# Patient Record
Sex: FEMALE | Race: ASIAN
[De-identification: names, ages, dates, MRNs, and addresses within clinical notes are randomized per-mention and may not be internally consistent; named-entity substitution may affect disease eponyms.]

---

## 2021-04-04 ENCOUNTER — HOSPITAL ENCOUNTER (EMERGENCY)
Dept: HOSPITAL 53 - M ED | Age: 31
Discharge: HOME | End: 2021-04-04
Payer: COMMERCIAL

## 2021-04-04 VITALS — WEIGHT: 213.19 LBS | BODY MASS INDEX: 37.77 KG/M2 | HEIGHT: 63 IN

## 2021-04-04 VITALS — SYSTOLIC BLOOD PRESSURE: 127 MMHG | DIASTOLIC BLOOD PRESSURE: 72 MMHG

## 2021-04-04 DIAGNOSIS — J06.9: ICD-10-CM

## 2021-04-04 DIAGNOSIS — Z11.52: Primary | ICD-10-CM

## 2021-04-04 PROCEDURE — 87880 STREP A ASSAY W/OPTIC: CPT

## 2021-04-04 PROCEDURE — 71045 X-RAY EXAM CHEST 1 VIEW: CPT

## 2021-04-04 PROCEDURE — 99284 EMERGENCY DEPT VISIT MOD MDM: CPT

## 2021-04-04 PROCEDURE — 87804 INFLUENZA ASSAY W/OPTIC: CPT

## 2021-04-04 RX ADMIN — ALBUTEROL SULFATE SCH PUFF: 90 AEROSOL, METERED RESPIRATORY (INHALATION) at 16:35

## 2021-04-04 RX ADMIN — ALBUTEROL SULFATE SCH PUFF: 90 AEROSOL, METERED RESPIRATORY (INHALATION) at 17:46

## 2021-04-04 RX ADMIN — ALBUTEROL SULFATE SCH PUFF: 90 AEROSOL, METERED RESPIRATORY (INHALATION) at 18:07

## 2021-04-04 NOTE — REP
INDICATION:

cough, SOB, loss taste.



COMPARISON:

None.



TECHNIQUE:

Portable AP chest with the patient upright..



FINDINGS:

The lung fields are clear.

Cardiac size is normal.

The treva, mediastinum and skeletal structures are unremarkable.





IMPRESSION:

Essentially negative portable chest





<Electronically signed by Sukh Collado > 04/04/21 [id number]

## 2021-06-19 ENCOUNTER — HOSPITAL ENCOUNTER (OUTPATIENT)
Dept: HOSPITAL 53 - M LAB | Age: 31
End: 2021-06-19
Attending: PHYSICIAN ASSISTANT
Payer: COMMERCIAL

## 2021-06-19 DIAGNOSIS — E28.2: Primary | ICD-10-CM

## 2021-06-19 DIAGNOSIS — E66.8: ICD-10-CM

## 2021-06-19 LAB
ALBUMIN SERPL BCG-MCNC: 3.9 GM/DL (ref 3.2–5.2)
ALT SERPL W P-5'-P-CCNC: 50 U/L (ref 12–78)
BILIRUB SERPL-MCNC: 0.5 MG/DL (ref 0.2–1)
BUN SERPL-MCNC: 12 MG/DL (ref 7–18)
CALCIUM SERPL-MCNC: 8.7 MG/DL (ref 8.5–10.1)
CHLORIDE SERPL-SCNC: 106 MEQ/L (ref 98–107)
CO2 SERPL-SCNC: 27 MEQ/L (ref 21–32)
CREAT SERPL-MCNC: 0.76 MG/DL (ref 0.55–1.3)
EST. AVERAGE GLUCOSE BLD GHB EST-MCNC: 117 MG/DL (ref 60–110)
GFR SERPL CREATININE-BSD FRML MDRD: > 60 ML/MIN/{1.73_M2} (ref 60–?)
GLUCOSE SERPL-MCNC: 85 MG/DL (ref 70–100)
POTASSIUM SERPL-SCNC: 4.3 MEQ/L (ref 3.5–5.1)
PROT SERPL-MCNC: 8.2 GM/DL (ref 6.4–8.2)
SODIUM SERPL-SCNC: 138 MEQ/L (ref 136–145)
T4 FREE SERPL-MCNC: 0.97 NG/DL (ref 0.76–1.46)
TSH SERPL DL<=0.005 MIU/L-ACNC: 1.69 UIU/ML (ref 0.36–3.74)

## 2021-06-21 LAB — THYROPEROXIDASE AB SERPL IA-ACNC: 30 U/ML (ref ?–60)

## 2021-06-22 LAB
INSULIN SERPL-ACNC: 24.4 UIU/ML (ref 2.6–24.9)
THYROGLOB AB SERPL-ACNC: < 1 IU/ML (ref 0–0.9)
THYROGLOB SERPL-MCNC: 16.1 NG/ML (ref 1.5–38.5)

## 2021-07-19 ENCOUNTER — HOSPITAL ENCOUNTER (OUTPATIENT)
Dept: HOSPITAL 53 - M RAD | Age: 31
End: 2021-07-19
Attending: PHYSICIAN ASSISTANT
Payer: COMMERCIAL

## 2021-07-19 DIAGNOSIS — G43.009: Primary | ICD-10-CM

## 2021-07-19 DIAGNOSIS — E28.2: ICD-10-CM

## 2021-07-19 NOTE — REPVR
PROCEDURE INFORMATION: 

Exam: CT Head Without Contrast 

Exam date and time: 7/19/2021 12:49 PM 

Age: 30 years old 

Clinical indication: Pain; Headache; Migraine; Aura effect not specified; 

Additional info: Migraine / pcos 



TECHNIQUE: 

Imaging protocol: Computed tomography of the head without contrast. 

Radiation optimization: All CT scans at this facility use at least one of these 

dose optimization techniques: automated exposure control; mA and/or kV 

adjustment per patient size (includes targeted exams where dose is matched to 

clinical indication); or iterative reconstruction. 



COMPARISON: 

No relevant prior studies available. 



FINDINGS: 

Brain: Normal. No hemorrhage. Unremarkable white matter. No mass effect. 

Cerebral ventricles: No ventriculomegaly. 

Paranasal sinuses: Visualized sinuses are unremarkable. No fluid levels. 

Mastoid air cells: Visualized mastoid air cells are well aerated. 

Bones/joints: Unremarkable. No acute fracture. 

Soft tissues: Unremarkable. 



IMPRESSION: 

No acute intracranial abnormality. 



Electronically signed by: Janis Lopez On 07/19/2021  13:22:00 PM

## 2021-07-19 NOTE — REP
INDICATION:

MIGRAINE / PCOS.



COMPARISON:

None.



TECHNIQUE:

Transabdominal and transvaginal scanning performed.



FINDINGS:

Uterine dimensions are  8.0 x 4.0 x 5.8 cm.  Endometrial echo is 11 mm in AP dimension

and centrally placed. Uterus is retroflexed.

The bladder measures 3.6 x 2.8 x 4.8cm.



The right ovary has dimensions of 3.8 x 2.5 x 1.7 cm.  It's Doppler flow is normal

with a resistive index of 0.56.



The left ovary dimensions are 3.2 x 1.9 x 2.5 cm.  It's Doppler flow was normal with

resistive index of 0.63.



There is no adnexal mass identified.



There is mild free fluid in the cul-de-sac.





IMPRESSION:

Negative pelvic ultrasound.  Mild free fluid.





<Electronically signed by Sukh Hankins > 07/19/21 [id number]

## 2021-12-19 NOTE — ROOPDOC
[facility] Report Of Operation


Report of Operation


DATE OF PROCEDURE: 12/19/2021





PREPROCEDURE DIAGNOSES: Left-sided ectopic pregnancy, ruptured.





POSTPROCEDURE DIAGNOSES: Left-sided cornual ectopic pregnancy, ruptured





PROCEDURE: Laparoscopic left salpingectomy/excision of left cornual ectopic 

pregnancy. 





SURGEON: SUSANA Yeung DO FACOG





ASSISTANT: None





ANESTHESIA: General endotracheal.





ESTIMATED BLOOD LOSS: Approximately 250 mL. 


IV FLUIDS: 1300 mL LR


BLOOD PRODUCTS: None





URINE OUTPUT: 200 mL via Macias catheter





COMPLICATIONS: None. 





FINDINGS: Left-sided cornual ectopic pregnancy, ruptured and actively bleeding, 

representative images taken. Approximately 250 to 300 mL of hemoperitoneum noted

at the beginning of the case.





PREOPERATIVE ANTIBIOTICS: none indicated.





SPECIMEN: Left fallopian tube with ectopic pregnancy





DESCRIPTION OF PROCEDURE: 





The patient was counseled and consented on the risks, benefits, indications and 

alternatives procedure.  Informed consent was obtained.  She was emergently 

transferred from the ER to the operating room.  The patient had 2 IVs.  She was 

placed on the operating table in the dorsal supine position.  Gen. anesthesia 

was administered and the airway was secured without any difficulty.  She was p

laced in the low lithotomy position.  She was prepared and draped in the normal 

sterile fashion. A time out was performed per protocol.





A Macias catheter was placed under sterile conditions.  A sterile speculum was 

placed with good visualization of the cervix.   A single-toothed tenaculum was 

placed onto the anterior lip of the cervix and downward traction was applied.  

The cervix was sequentially dilated with Robby dilators.  The ZUMI uterine 

manipulator was then placed without any difficulty.  The sterile speculum was 

removed. The single-tooth tenaculum was removed.





A sterile glove switch was performed.  Attention was turned to the abdomen.  A 1

cm umbilical incision was made with the 11 blade.  A Veress needle was placed 

into the intraperitoneal cavity without any difficulty.  Intraperitoneal 

placement was confirmed with ease of flow of normal saline, a positive drop test

and negative return on aspiration.  Opening pressure was less than 10 mmHg.  The

abdomen was insufflated with 2 L of CO2 gas.  





The size 11 mm laparoscopic trocar/cannula was inserted under direct 

laparoscopic visualization into the intraperitoneal cavity.  The ectopic 

pregnancy was noted to be located within the cornual portion of the left 

fallopian tube.  The fallopian tube was followed out to the fimbriated end, and 

elevated.  The underlying mesosalpinx was sequentially clamped, coagulated and 

transected using the 5 mm LigaSure bipolar device, until the level of the cornu 

was reached.  At the level of the cornu, the ectopic pregnancy was excised using

the L-hook LigaSure device and the bipolar jaws. L-hook LigaSure device was used

to cauterize the surface of the uterus medial to the cornual portion of the 

fallopian tube that was excised.  Excellent hemostasis of the left adnexa/left 

side of the uterus was noted.





The specimens that had been placed in the anterior cul-de-sac were grasped and 

brought through the 11 mm cannula.  The ectopic pregnancy and fallopian tube 

removed from the abdomen through the umbilical incision without any difficulty.





The remaining hemoperitoneum was evacuated using the suction .  The 

pelvis was reinspected and noted to be hemostatic.  Representative images were 

taken.  





The umbilical cannula was removed.  Using the Eleazar-Shanda technique, the 

fascia at the umbilicus was closed with 0 Vicryl.  The gas was released from the

abdomen through the other cannulas.  Those cannulas were then removed.  Each 

skin incision was closed with 4-0 Monocryl in subcuticular fashion.





The ZUMI uterine manipulator was removed.  The Macias catheter was removed. 

Minimal bleeding from the vagina was noted. Sponge, needle and instrument counts

were correct per protocol.  The patient tolerated the entire procedure very 

well. She was transferred to the PACU in good stable condition.





DO BALTAZAR Cullen JONATHAN R. DO           Dec 19, 2021 12:37

## 2021-12-19 NOTE — REP
INDICATION:

abdominal pain



COMPARISON:

None.



TECHNIQUE:

Transabdominal and transvaginal 1st trimester obstetrical ultrasound with color

Doppler evaluation.



FINDINGS:

Retroverted uterus measures 10.3 x 5.6 x 6.9 cm.  The endometrial demonstrates

heterogeneous decidual reaction and complex measures 27.4 mm thickness without

intrauterine pregnancy.



The bilateral ovaries are normal in appearance and vascularity without torsion or

mass.  Right ovary measures 2.9 x 1.4 x 1.0 cm (RI 0.57).  Left ovary measures 3.7 x

1.5 x 1.6 cm (RI 0.49).



Large amount of complex free fluid noted within the pelvis along with a 2.1 x 1.6 x

1.9 cm thick walled hyperechoic mass in the left adnexa highly suspicious for ruptured

ectopic pregnancy.



IMPRESSION:

1.  Findings most suspicious for ruptured ectopic pregnancy in the left hemipelvis.





<Electronically signed by Ronaldo Ruiz > 12/19/21 0855